# Patient Record
Sex: MALE | Race: AMERICAN INDIAN OR ALASKA NATIVE | HISPANIC OR LATINO | URBAN - METROPOLITAN AREA
[De-identification: names, ages, dates, MRNs, and addresses within clinical notes are randomized per-mention and may not be internally consistent; named-entity substitution may affect disease eponyms.]

---

## 2021-11-05 ENCOUNTER — WEB ENCOUNTER (OUTPATIENT)
Dept: URBAN - METROPOLITAN AREA CLINIC 113 | Facility: CLINIC | Age: 41
End: 2021-11-05

## 2021-11-05 ENCOUNTER — OFFICE VISIT (OUTPATIENT)
Dept: URBAN - METROPOLITAN AREA CLINIC 113 | Facility: CLINIC | Age: 41
End: 2021-11-05
Payer: COMMERCIAL

## 2021-11-05 VITALS
WEIGHT: 229 LBS | DIASTOLIC BLOOD PRESSURE: 80 MMHG | SYSTOLIC BLOOD PRESSURE: 120 MMHG | HEIGHT: 70 IN | BODY MASS INDEX: 32.78 KG/M2 | HEART RATE: 75 BPM | RESPIRATION RATE: 18 BRPM | TEMPERATURE: 98.1 F

## 2021-11-05 DIAGNOSIS — R19.7 DIARRHEA: ICD-10-CM

## 2021-11-05 DIAGNOSIS — R74.8 ELEVATED LIVER ENZYMES: ICD-10-CM

## 2021-11-05 PROCEDURE — 99244 OFF/OP CNSLTJ NEW/EST MOD 40: CPT | Performed by: INTERNAL MEDICINE

## 2021-11-05 PROCEDURE — 99204 OFFICE O/P NEW MOD 45 MIN: CPT | Performed by: INTERNAL MEDICINE

## 2021-11-05 RX ORDER — POLYETHYLENE GLYCOL 3350, SODIUM SULFATE, SODIUM CHLORIDE, POTASSIUM CHLORIDE, ASCORBIC ACID, SODIUM ASCORBATE 140-9-5.2G
AS DIRECTED KIT ORAL ONCE
Qty: 140 GRAMS | Refills: 0 | OUTPATIENT
Start: 2021-11-05 | End: 2021-11-06

## 2021-11-05 RX ORDER — DEXTROAMPHETAMINE SULFATE, DEXTROAMPHETAMINE SACCHARATE, AMPHETAMINE SULFATE AND AMPHETAMINE ASPARTATE 7.5; 7.5; 7.5; 7.5 MG/1; MG/1; MG/1; MG/1
2 CAPSULES CAPSULE, EXTENDED RELEASE ORAL ONCE A DAY
Status: ACTIVE | COMMUNITY

## 2021-11-05 NOTE — HPI-TODAY'S VISIT:
41-year-old male referred by Dr. Marco Wong for evaluation of elevated liver enzymes.  A copy of this document is being forwarded to the referring provider.  Within the last 1 to 2 years, he has experienced a change in bowel habits described as an increase in stool frequency.  He has 5-7 often loose stools per day.  He denies fecal urgency or blood per rectum, however, he does experience nocturnal bowel movements 1-3 nights per week.  He tried Metamucil for about a month without change.  Over the last week, he has experienced a dull ache to the right upper quadrant which is worsened with positional changes.  This is not related to eating, and he questions whether he may have pulled a muscle.  No nausea or vomiting.  He admits to excessive alcohol use on the weekends and takes ibuprofen on occasion.  He denies OTC supplement use.  Labs 10/1/2021:AST 50, , , T bili 0.5. 9/21/2020:. Abdominal ultrasound in early October showed hepatic steatosis. Reviewed on [facility] EMR.

## 2021-11-07 LAB
ACTIN (SMOOTH MUSCLE) ANTIBODY: 4
ALBUMIN: 4.7
ALKALINE PHOSPHATASE: 96
ALT (SGPT): 41
ANA DIRECT: NEGATIVE
AST (SGOT): 35
BILIRUBIN, DIRECT: 0.11
BILIRUBIN, TOTAL: 0.4
FERRITIN, SERUM: 170
HBSAG SCREEN: NEGATIVE
HEP A AB, TOTAL: NEGATIVE
HEP B CORE AB, TOT: NEGATIVE
HEP B SURFACE AB, QUAL: NON REACTIVE
HEP C VIRUS AB: <0.1
MITOCHONDRIAL (M2) ANTIBODY: <20
PROTEIN, TOTAL: 7.7

## 2021-12-21 ENCOUNTER — OFFICE VISIT (OUTPATIENT)
Dept: URBAN - METROPOLITAN AREA SURGERY CENTER 25 | Facility: SURGERY CENTER | Age: 41
End: 2021-12-21
Payer: COMMERCIAL

## 2021-12-21 DIAGNOSIS — D12.7 ADENOMA DETERMINED BY COLORECTAL BIOPSY: ICD-10-CM

## 2021-12-21 DIAGNOSIS — R19.7 DIARRHEA: ICD-10-CM

## 2021-12-21 PROCEDURE — G8907 PT DOC NO EVENTS ON DISCHARG: HCPCS | Performed by: INTERNAL MEDICINE

## 2021-12-21 PROCEDURE — 45385 COLONOSCOPY W/LESION REMOVAL: CPT | Performed by: INTERNAL MEDICINE

## 2021-12-21 RX ORDER — DEXTROAMPHETAMINE SULFATE, DEXTROAMPHETAMINE SACCHARATE, AMPHETAMINE SULFATE AND AMPHETAMINE ASPARTATE 7.5; 7.5; 7.5; 7.5 MG/1; MG/1; MG/1; MG/1
2 CAPSULES CAPSULE, EXTENDED RELEASE ORAL ONCE A DAY
Status: ACTIVE | COMMUNITY

## 2021-12-30 ENCOUNTER — OFFICE VISIT (OUTPATIENT)
Dept: URBAN - METROPOLITAN AREA CLINIC 113 | Facility: CLINIC | Age: 41
End: 2021-12-30
Payer: COMMERCIAL

## 2021-12-30 ENCOUNTER — DASHBOARD ENCOUNTERS (OUTPATIENT)
Age: 41
End: 2021-12-30

## 2021-12-30 ENCOUNTER — WEB ENCOUNTER (OUTPATIENT)
Dept: URBAN - METROPOLITAN AREA CLINIC 113 | Facility: CLINIC | Age: 41
End: 2021-12-30

## 2021-12-30 VITALS
SYSTOLIC BLOOD PRESSURE: 126 MMHG | BODY MASS INDEX: 33.5 KG/M2 | HEART RATE: 85 BPM | DIASTOLIC BLOOD PRESSURE: 82 MMHG | WEIGHT: 234 LBS | TEMPERATURE: 98.1 F | HEIGHT: 70 IN | RESPIRATION RATE: 18 BRPM

## 2021-12-30 DIAGNOSIS — D36.9 TUBULAR ADENOMA: ICD-10-CM

## 2021-12-30 DIAGNOSIS — R74.8 ELEVATED LIVER ENZYMES: ICD-10-CM

## 2021-12-30 DIAGNOSIS — K76.0 HEPATIC STEATOSIS: ICD-10-CM

## 2021-12-30 DIAGNOSIS — R19.7 DIARRHEA: ICD-10-CM

## 2021-12-30 PROBLEM — 197321007 STEATOSIS OF LIVER: Status: ACTIVE | Noted: 2021-11-05

## 2021-12-30 PROCEDURE — 99213 OFFICE O/P EST LOW 20 MIN: CPT | Performed by: NURSE PRACTITIONER

## 2021-12-30 RX ORDER — DEXTROAMPHETAMINE SULFATE, DEXTROAMPHETAMINE SACCHARATE, AMPHETAMINE SULFATE AND AMPHETAMINE ASPARTATE 7.5; 7.5; 7.5; 7.5 MG/1; MG/1; MG/1; MG/1
2 CAPSULES CAPSULE, EXTENDED RELEASE ORAL ONCE A DAY
Status: ACTIVE | COMMUNITY

## 2021-12-30 NOTE — HPI-OTHER HISTORIES
Labs 10/1/2021:AST 50, , , T bili 0.5. 9/21/2020:. Abdominal ultrasound in early October showed hepatic steatosis. Reviewed on [facility] EMR.

## 2021-12-30 NOTE — HPI-TODAY'S VISIT:
41-year-old male presenting for follow-up of elevated liver enzymes and diarrhea. He was last seen 11/5/2021 for evaluation of elevated liver enzymes suspected to be related to fatty liver and alcohol use.  Additional labs were planned to exclude viral, autoimmune, or hereditary cause.  Regarding diarrhea and nocturnal defecation, a diagnostic colonoscopy with random biopsies was planned. Colonoscopy 12/21/2021:one 8 mm tubular adenoma at the rectosigmoid colon, normal mucosa in the entire examined colon status post random biopsies which were unremarkable.  Repeat colonoscopy recommended in 5 years for surveillance. Labs 11/5/2021:AST 35, ALT 41, ALP 96, T bili 0.4.  Negative AMA, KALANI, ASMA.  Negative hepatitis A antibody total, hepatitis B surface antigen and antibody, hepatitis B core antibody total, hepatitis C antibody.  Ferritin 170. His diarrhea has nearly resolved following the bowel cleanse for the colonoscopy. His bowel habits are regular and he denies blood per rectum. No abdominal pain, nausea or vomiting. He has reduced his alcohol intake.